# Patient Record
Sex: FEMALE | Race: WHITE | URBAN - METROPOLITAN AREA
[De-identification: names, ages, dates, MRNs, and addresses within clinical notes are randomized per-mention and may not be internally consistent; named-entity substitution may affect disease eponyms.]

---

## 2021-04-12 ENCOUNTER — HOSPITAL ENCOUNTER (EMERGENCY)
Age: 41
Discharge: LEFT BEFORE DISCHARGE | End: 2021-04-12

## 2021-04-12 VITALS — TEMPERATURE: 97.1 F

## 2021-04-12 NOTE — ED NOTES
Pt walked outside after getting registered with visitor. Triage nurse and LPN went outside to look for pt, unable to locate patient.       Wale Rehman LPN  [id number]

## 2023-02-27 ENCOUNTER — HOSPITAL ENCOUNTER (OUTPATIENT)
Dept: PSYCHIATRY | Age: 43
Setting detail: THERAPIES SERIES
Discharge: HOME | End: 2023-02-27
Payer: MEDICAID

## 2023-02-27 PROCEDURE — H0015 ALCOHOL AND/OR DRUG SERVICES: HCPCS

## 2023-02-27 NOTE — PLAN OF CARE
Client discussed in treatment team today. Present: Ashley Schultz , Deepika PAT, and Dr. Eli Sutherland M.D. Current Status: IOP     Treatment goals:    [x] Relapse prevention  [x] Increase sober support  [x] Increase attendance in sober support groups  [x] [place] effectively with cravings and urges  [x] Other: Relapse prevention     Recommendation: Client making good progress as evident by active participation in group, demonstrating insight , regular attendance at AA/ NA meetings. Reccommended to continue IOP to strengthen coping skills to manage cravings & urges.     Electronically signed by Lyla Luevano [place] 320 on 5/32/7445 at 2:43 PM

## 2023-02-27 NOTE — GROUP NOTE
Start Time: 900 AM  End Time: 10:15  Number of participants:8  Type of group: Psychotherapy  Mode of intervention: Education, Support, Socialization, Exploration, Clarifying, Problem-solving, Confrontation, Limit-setting, and Reality-testing  Topic: Motivation for Sobriety  Objective:  Explore and provide insight client to gain awareness on their reasoning for maintaining sobriety to aid recovery process. Note: Client actively participated in the group session. The session focused on assisting client with identifying reasons for sobriety and understanding reason's why past attempts at sobriety failed. Client reports motivation for quitting substance abuse is staying healthy and avoiding future consequences of addiction related to family issues and legal problems. Status after intervention:Improved  Participation level: Active Listener and Interactive  Participation Quality: Appropriate, Attentive, Sharing, and Supportive  Speech: normal  Thought Process/Content:Logical  Mood/Affect: brightens and congruent  Self report: None Reported  Response to learning: Able to verbalize current knowledge/experience, Able to verbalize/acknowledge new learning, Able to retain information, Capable of insight, Able to change behavior, and Progressing to goal  Discipline Responsible: /Counselor    [x] Check in completed and reviewed. Intervention required.  No    Electronically signed by Moise Yu on 9/46/1360 at 1:50 PM

## 2023-02-27 NOTE — GROUP NOTE
Start Time: 56 AM   End Time: 12:00 PM   Number of participants:7  Type of group: Psychoeducation  Mode of intervention: Education, Support, Socialization, Exploration, Clarifying, Problem-solving, Confrontation, Limit-setting, and Reality-testing  Topic: HIV/ AIDS/ STD education  Objective:  Explore the dangerous risks associated with substance use and aid client with gaining awareness to make improved decision making skills. Note: Client actively participated in the group session. Client was presented education and information from [facility] related to diease and health risk associated with substance use behaviors. Client was able to identify and verbalize and acknowledge 1-2 negative high risks associated with substance use behavior and disease risk. Status after intervention:Improved  Participation level: Active Listener and Interactive  Participation Quality: Appropriate, Attentive, Sharing, and Supportive  Speech: normal  Thought Process/Content:Logical  Mood/Affect: brightens and congruent  Self report: None Reported  Response to learning: Able to verbalize current knowledge/experience, Able to verbalize/acknowledge new learning, Able to retain information, Capable of insight, Able to change behavior, and Progressing to goal  Discipline Responsible: /Counselor    [x] Check in completed and reviewed. Intervention required.  No    Electronically signed by Edu Khan on 2/08/8703 at 1:59 PM

## 2023-02-28 ENCOUNTER — HOSPITAL ENCOUNTER (OUTPATIENT)
Dept: PSYCHIATRY | Age: 43
Setting detail: THERAPIES SERIES
Discharge: HOME | End: 2023-02-28
Payer: MEDICAID

## 2023-02-28 PROCEDURE — 80305 DRUG TEST PRSMV DIR OPT OBS: CPT

## 2023-02-28 NOTE — GROUP NOTE
( NON- BILLABLE )    Start Time: 900 AM   End Time: 10:00 AM   Number of participants:10  Type of group: Psychotherapy  Mode of intervention: Education, Support, Socialization, Exploration, Clarifying, Problem-solving, Confrontation, Limit-setting, and Reality-testing  Topic: Coping   Objective:  Explore techniques and coping skills to assist client with gaining awareness and recovery tools to aid continued sobriety. Note: Client minimally participated in the group session due to difficulty staying awake and falling asleep in group. Client reports she was unable to sleep yesterday and counselor allowed client to go home because she was not able to focus and her falling asleep was triggering to other clients. Status after intervention:Unchanged  Participation level: Minimal  Participation Quality: Inappropriate and Lethargic  Speech: normal  Thought Process/Content:Logical  Mood/Affect: calm  Self report: None Reported  Response to learning: Resistant  Discipline Responsible: /Counselor    [x] Check in completed and reviewed. Intervention required.  No    Electronically signed by Lyla Khan [place] 320 on 5/5/2233 at 9:04 AM

## 2023-03-02 ENCOUNTER — HOSPITAL ENCOUNTER (OUTPATIENT)
Dept: PSYCHIATRY | Age: 43
Setting detail: THERAPIES SERIES
Discharge: HOME | End: 2023-03-02
Payer: MEDICAID

## 2023-03-02 PROCEDURE — H0015 ALCOHOL AND/OR DRUG SERVICES: HCPCS

## 2023-03-03 NOTE — BH NOTE
Group Therapy Note    Start Time: 0900  End Time:   0945  Number of participants: 8  Type of group: Exercise for mental health    Renay Valenzuela JUNE-CNP     Mode of intervention: Education, Support, Socialization, Exploration, Clarifying, Problem-solving. Topic: Anxiety, depression, substance dependence   Objective: To help develop awareness and to explore physical exercise that can help with anxiety, depression, substance dependence to improve mood. Note: Client actively participated in the group session. Exercise was presented and information given related to anxiety, depression and substance dependence. Client thoughts and feelings toward exercise were explored. Client was receptive to information presented. Status after intervention:  Improved    Participation level; Active listener and interactive    Participation Quality:  Appropriate, Attentive and Sharing    Speech:  Normal  Thoughts Process/Content:  Logical  Mood/Affect:  Euthymic/ Bright  Self-Report: None Reported  Response to learning:  Able to verbalize current knowledge/experience. Able to verbalize new learning. Was able to retain information and capable of insight. Discipline Responsible:  PMHNP    Patients were told only do the exercise that they could do. They were not forced to exercise. They were given the option to watch and not participate.      Diagnosis F19.20

## 2023-03-06 ENCOUNTER — HOSPITAL ENCOUNTER (EMERGENCY)
Age: 43
Discharge: LEFT BEFORE DISCHARGE | End: 2023-03-06

## 2023-03-06 ENCOUNTER — HOSPITAL ENCOUNTER (OUTPATIENT)
Dept: PSYCHIATRY | Age: 43
Setting detail: THERAPIES SERIES
Discharge: HOME | End: 2023-03-06
Payer: MEDICAID

## 2023-03-06 PROCEDURE — 80305 DRUG TEST PRSMV DIR OPT OBS: CPT

## 2023-03-06 PROCEDURE — H0015 ALCOHOL AND/OR DRUG SERVICES: HCPCS

## 2023-03-06 NOTE — PROGRESS NOTES
Start Time: 900 AM   End Time : 10:30 AM  Number of participants:9  Type of group: Psychotherapy  Mode of intervention: Education, Support, Socialization, Exploration, Clarifying, Problem-solving, Confrontation, Limit-setting, and Reality-testing  Topic: Isolation  Objective: Explore relapse risks associated with isolation and develop skills to increase sober support to aid continued sobriety. Note: Client actively participated in the group session. Client reported no isolation  and she shared with the group 2-4 behavioral changes she has made that consist of going to meetings, attending [employer] , and being with family as ways to increase sober support to reduce isolation and boredom. Status after intervention:Improved  Participation level: Active Listener and Interactive  Participation Quality: Appropriate, Attentive, Sharing, and Supportive  Speech: normal  Thought Process/Content:Logical  Mood/Affect: brightens and congruent  Self report: None Reported  Response to learning: Able to verbalize current knowledge/experience, Able to verbalize/acknowledge new learning, Able to retain information, Capable of insight, Able to change behavior, and Progressing to goal  Discipline Responsible: /Counselor    [x] Check in completed and reviewed. Intervention required.  No    Electronically signed by Lyla Sena [place] 320 on 6/3/7733 at 3:52 PM

## 2023-03-06 NOTE — PLAN OF CARE
[facility]- Level of Care Placement      []Admissions  [x]Continued Stay [x]Complication in [place] - relapse 2/23/23 - Amphetamines Date:3/6/2023          Level of Care Level 1      Outpatient Services Level 2.1   Intensive Outpatient Services(IOP) Level 2.5   Partial Hospitalization Services Level 3.1 CLINICALLY Managed Low-Intensity Residential Services Level 3.3  CLINICALLY Managed Population- Specific High- Intensity Residential Services Level 3.5  CLINICALLY Managed High Intensive Residential Services Level 3.7  MEDICALLY Monitored Intensive Inpatient Services Level 4  MEDICALLY Managed Intensive Inpatient Services   Dimension 1  Acute Intoxication and/or Withdrawal Potential [x] Not experiencing significant withdrawal    [] Minimal  risk of severe  withdrawal [] Minimal  risk of severe  withdrawal    [] Manageable  at Level 2-WM [] Moderate  risk of severe withdrawal    [] Manageable at Level 2-WM [] No withdrawal risk or minimal or stable withdrawal     [] Concurrently receiving Level -WM or Level 2-WM services [] Minimal risk of severe withdrawal    [] If withdrawal is present, manageable at Level 3. 2-WM  [] Minimal risk of severe withdrawal    [] If withdrawal is present manageable at Level 3. 2-WM [] High risk of withdrawal, but manageable at Level  3.7-WM and does not require  full resources of a licensed hospital [] At high risk of withdrawal and requires Level 4- and full resources of licensed hospital    COMMENTS:           Dimension 2   Biomedical Conditions and Complications  (BMC/C) [x] None or very stable    [] Receiving concurrent medical monitoring  [] None or not a distraction from treatment    [] Problems are manageable at Level 2.1 [] None or not sufficient to distract from treatment    [] Problems are manageable at  Level 2.5 [] None or stable    [] Receiving concurrent medical monitoring  [] None or stable    [] Receiving concurrent medical monitoring  [] None or stable    [] Receiving concurrent medical monitoring [] Requires 24-hour medical monitoring  but not intensive treatment [] Requires 24-hour medical & nursing care and the full  resources of a licensed hospital   COMMENTS:           Dimension 3  Emotional,  Behavioral,  or Cognitive Conditions and Complications   (EBC/C) [x] None or very stable    [] Receiving concurrent mental health monitoring [] Mild severity  with potential to distract from recovery; needs monitoring [] Mild to moderate severity, with potential to distract from recovery, needs stabilization [] None or minimal; not distracting to recovery    [] If  stable, a    co-occurring capable program is appropriate    [] If not stable, a co-occurring enhanced program is required [] Mild to moderate severity; needs structure to focus on recovery. Treatment should be designed to address significant cognitive deficits     [] If  stable, a  co-occurring capable program is appropriate    [] If not stable, a co-occurring enhanced program is required [] Demonstrates repeated inability to control impulses or unstable & dangerous signs/ symptoms require stabilization. Other functional deficits require stabilization and 24-hr.  setting to prepare for community integration  & continuing care    [] Co-occurring enhanced setting is required for those with severe & chronic mental illness [] Moderate severity;  needs 24-hour   structured setting    [] If client has co-occurring mental disorder, requires concurrent mental health services in a medically monitored setting  [] Severe and unstable problems;  requires 24-hour psychiatric care  with concomitant addiction treatment   COMMENTS:             Electronically signed by Lyla Leija [place] 320 on 6/1/1956 at 5:36 PM                  [address] Placement      []Admissions  [x]Continued Stay [x]Complication in [place]- Relapse to Amphetamines Date:3/6/2023          Level of Care Level 1  Outpatient   Services Level 2.1  Intensive  Outpatient  Services Level 2.5  Partial Hospitalization Services Level 3.1  CLINICALLY Managed Low-intensity Residential Services Level 3.3  CLINICALLY Managed Population- Specific High- Intensity Residential Services Level 3.5  CLINICALLY Managed High-Intensive Residential Services Level 3.7  MEDICALLY Monitored Intensive Inpatient Services Level 4  MEDICALLY Managed Intensive Inpatient Services   Dimension 4  Readiness  To  Change [] Ready for recovery but needs motivating and monitoring strategies to strengthen readiness    []Needs ongoing monitoring and disease management    [] High severity in this dimension but not in other dimensions. Needs Level 1 motivational enhancement strategies   [x] Has variable engagement in treatment, ambivalence, or lack of awareness of substance use or mental health problems and requires structured program several times/wk. to promote progress through stages of change [] Has poor engagement in treatment, significant ambivalence, or lack of awareness of substance use or mental health problems, requires near daily structured program or intensive engagement to promote progress through stages of change [] Open to recovery, but needs structured environment to maintain therapeutic gains [] Has little awareness & needs interventions at Level 3.3 to engage & stay in treatment.     [] If there is high severity in this dimension, but not in any other dimension, motivational enhancement strategies should be provided in Level 1 [] Has marked difficulty with, or opposition to treatment with dangerous consequences    [] If there is high severity in this dimension, but not in any other dimension, motivational enhancement strategies should be provided in Level 1 [] Low interest in treatment and impulse control is poor despite negative consequences;  needs motivating strategies only safely available in 24-hour structured setting    [] If there is high severity in this dimension, but not in any other dimension, motivational enhancement strategies should be provided in Level 1 [] Problems in this dimension do not qualify the client for Level 4 services    [] If the client's only severity is in Dimension 4,5, and/or 6 without high severity in Dimensions 1,2, and/or 3, then client is not qualified for Level 4   COMMENTS:           Dimension 5  Relapse, Continued Use or Continued Problem Potential  [] Able to maintain abstinence or control use and/or addictive behaviors  and pursue recovery or motivational goals with minimal support [x] Intensification of addiction or mental health symptoms indicate high likelihood of relapse risk or continued use or continued problems without  close monitoring and support several times/wk.  [] Intensification of addiction or mental health symptoms, despite active participation in a Level 1 or 2.1 program, indicates high likelihood of relapse or continued use or continued problems without near-daily monitoring [] Understands relapse but needs structure to maintain therapeutic gains  [] Has little awareness and needs interventions available only at Level 3.3 to prevent continued use, with imminent dangerous consequences, because of cognitive deficits or  comparable dysfunction  [] Has no recognition  of the skills needed to prevent continued use,  with imminently dangerous  consequences [] Unable to control use, with imminently dangerous consequences,  despite active participation at less intensive levels of care [] Problems in this dimension do not qualify the client for Level 4 services    [] If the client's only severity is in Dimension 4,5, and/or 6 without high severity in Dimensions 1,2, and/or 3, then client is not qualified for Level 4   COMMENTS:           Dimension 6  Recovery/Living Environment [x]  Recovery environment is supportive and/or the client has skills to cope [] Recovery environment is not supportive  but with structure and support, the client can cope [] Recovery environment is not supportive, but with structure and support and relief from the home environment, client can cope [] Environment    is dangerous, but recovery is achievable if Level 3.1 24-hour structure is available  [] Environment is dangerous and  client needs 24-hour structure to learn to cope [] Environment is dangerous, and the client lacks skills to cope outside of a  highly structured 24-hour setting   [] Environment is dangerous, and the client lacks skills to cope outside of a  highly structured 24-hour setting [] Problems in this dimension do not qualify the client for Level 4 services    [] If the client's only severity is in Dimension 4,5, and/or 6 without high severity in Dimensions 1,2, and/or 3, then client is not qualified for Level 4   COMMENTS:             Electronically signed by Lyla Montague [place] 320 on 6/8/3216 at 5:37 PM

## 2023-03-06 NOTE — CARE COORDINATION
Counselor reviewed with client positive drug screen result for Amphetamines on 2/23/2023. Client initially was not honest about her usage , however she admitted to licking a bag that had Methamphetamines because she found it in her house. Counselor processed with client the dangerous impact of her relapse and processed with client her lack of insight and poor judgement. In addition, her  was called and made aware of clients relapse and client was told that her treatment will be extended to strengthen her recovery process.    Electronically signed by FRANCISCO Gleason on 3/6/2023 at 5:34 PM

## 2023-03-06 NOTE — FLOWSHEET NOTE
Client left the group and went to her doctor's office from 11:00 AM to 12:00 PM due to reporting feeling that her throat was closing and she was excused.     Electronically signed by Lyla [place] 320 on 9/5/7457 at 5:31 PM

## 2023-03-07 ENCOUNTER — HOSPITAL ENCOUNTER (OUTPATIENT)
Dept: PSYCHIATRY | Age: 43
Setting detail: THERAPIES SERIES
Discharge: HOME | End: 2023-03-07
Payer: MEDICAID

## 2023-03-13 ENCOUNTER — HOSPITAL ENCOUNTER (OUTPATIENT)
Dept: PSYCHIATRY | Age: 43
Setting detail: THERAPIES SERIES
Discharge: HOME | End: 2023-03-13
Payer: COMMERCIAL

## 2023-03-13 PROCEDURE — 80305 DRUG TEST PRSMV DIR OPT OBS: CPT

## 2023-03-13 PROCEDURE — H0015 ALCOHOL AND/OR DRUG SERVICES: HCPCS

## 2023-03-13 NOTE — PROGRESS NOTES
Start Time: 900 AM   End Time: 56 AM  Number of participants:10  Type of group: Psychotherapy  Mode of intervention: Education, Support, Socialization, Exploration, Clarifying, Activity, Confrontation, Limit-setting, and Reality-testing  Topic: Triggers & Relapse Prevention  Objective:  Explore psychological set-up to assist client with gaining awareness  to prevent relapse and increasing recovery tools. Note: Client actively participated in the group session and admitted to the group that she relapsed (2) weeks ago by licking a bag with Methamphetamine resin. Client verbalized poor time management , and lack of self care as triggers for relapse, however client seems unmotivated to focus on herself , and her recovery. She verbalizes no active attendance at AA/ NA support group meetings. Counselor processed with client behavorial changes she needs to make by putting her recovery first and getting active with her sponsor. Status after intervention:Unchanged  Participation level: Active Listener and Interactive  Participation Quality: Appropriate, Attentive, and Sharing  Speech: normal  Thought Process/Content:Logical  Mood/Affect: anxious  Self report: None Reported  Response to learning: Able to verbalize current knowledge/experience and Resistant  Discipline Responsible: /Counselor    [x] Check in completed and reviewed. Intervention required.  No    Electronically signed by Charmayne Handy, [address] on 6/75/2086 at 2:46 PM

## 2023-03-13 NOTE — PROGRESS NOTES
Start Time: [id number] AM   End Time: 12:00 PM  Number of participants:9  Type of group: Psychoeducation  Mode of intervention: Education, Support, Socialization, Exploration, Clarifying, Problem-solving, Confrontation, Limit-setting, and Reality-testing  Topic: Disease Model of Addiction  Objective:  Explore and process how substance abuse is a disease and impacts on the brain. Note: Client actively participated in the group session. Client was presented education on the diease model of addiction, such as primary, chronic , progressive , genetic, and fatal . Client was able to acknowledge aspects of the disease model and verbalize 1-2 insights from the education. Status after intervention:Unchanged  Participation level: Active Listener, Interactive, and Minimal  Participation Quality: Appropriate and Attentive  Speech: normal  Thought Process/Content:Logical  Mood/Affect: anxious  Self report: None Reported  Response to learning: Able to verbalize current knowledge/experience, Able to verbalize/acknowledge new learning, Able to retain information, and Capable of insight  Discipline Responsible: /Counselor    [x] Check in completed and reviewed. Intervention required.  No    Electronically signed by Lyla [place] 320 on 3/51/8086 at 5:21 PM

## 2023-03-14 ENCOUNTER — HOSPITAL ENCOUNTER (OUTPATIENT)
Dept: PSYCHIATRY | Age: 43
Setting detail: THERAPIES SERIES
Discharge: HOME | End: 2023-03-14
Payer: COMMERCIAL

## 2023-03-14 NOTE — FLOWSHEET NOTE
Client reported off for 3/9/2023 stating she was being transported to [facility] in [place] and would not be in group.

## 2023-03-14 NOTE — FLOWSHEET NOTE
Luis Shook called off group again today stating she did not get any sleep last night . Client was told her absence will not be excused but she can take the day off. Client was told she needs to prioritize her time better and she was suggested to get a mental health assessment due to reporting feeling \"anxious\" and \"manic\". Counselor provided client of local mental health agencies she can call to set-up for assessment.     Electronically signed by Lyla Duncan [place] 320 on 3/11/3065 at 8:49 AM

## 2023-03-15 NOTE — CARE COORDINATION
3/15/2023: Client called in for random drug screen at 10:57 AM. Client was told she has until 4:00 PM to submit random drug screen and also informed if she does not report for screening it is a positive result  Electronically signed by Edu Sanchez [place] on 8/88/7181 at 10:59 AM

## 2023-03-15 NOTE — PLAN OF CARE
Client discussed in treatment team today. Present: Cathleen Leonard , Deepika PAT, and Dr. Ashley Brambila M.D..     Current Status: IOP     Treatment goals:    [x] Relapse prevention  [x] Increase sober support  [x] Increase attendance in sober support groups  [x] Alligator effectively with cravings and urges  [] Other:      Recommendation: Client relapsed and used Amphetamines, lack of insight and commitment to recovery. Client treatment is extended and her meetings will increase to 3 a week.     Electronically signed by Edu Elias on 5/39/5127 at 11:23 AM

## 2023-03-16 ENCOUNTER — HOSPITAL ENCOUNTER (OUTPATIENT)
Dept: PSYCHIATRY | Age: 43
Setting detail: THERAPIES SERIES
Discharge: HOME | End: 2023-03-16
Payer: COMMERCIAL

## 2023-03-16 PROCEDURE — 80305 DRUG TEST PRSMV DIR OPT OBS: CPT

## 2023-03-16 PROCEDURE — H0015 ALCOHOL AND/OR DRUG SERVICES: HCPCS

## 2023-03-16 NOTE — PROGRESS NOTES
Start Time: 65 AM  End Time: 12:00 PM  Number of participants:8  Type of group: Relapse Prevention  Mode of intervention: Education, Support, Socialization, Exploration, Clarifying, Problem-solving, Confrontation, Limit-setting, and Reality-testing  Topic: Consequences of addiction behavior  Objective:  Recognize your own behaviors and thoughts that cause on-going consequences of addiction . Note: Client actively participated in the group session. Client was assigned to identify and explore 1-2 consequences that have resulted from substance use. Client identified and explored legal issues as a consequences and she identified staying active with sober support as a  coping skill to prevent re-occurring consequences. Client completed the IOP program on this date. Status after intervention:Improved  Participation level: Active Listener and Interactive  Participation Quality: Appropriate, Attentive, and Sharing  Speech: normal  Thought Process/Content:Logical  Mood/Affect: calm and congruent  Self report: None Reported  Response to learning: Able to verbalize current knowledge/experience, Able to verbalize/acknowledge new learning, Able to retain information, and Capable of insight  Discipline Responsible: /Counselor    [x] Check in completed and reviewed. Intervention required.  No    Electronically signed by Lyla [place] 320 on 4/62/6240 at 3:55 PM

## 2023-03-16 NOTE — PROGRESS NOTES
Start Time : 80 AM  End Time: 10:30 AM  Number of participants:8  Type of group: Psychotherapy  Mode of intervention: Education, Support, Socialization, Exploration, Clarifying, Problem-solving, Confrontation, Limit-setting, and Reality-testing  Topic: Self-Care  Objective:  Develop a better understanding of ones own needs and ways to meet them without substance use. Note: Client active in group session. She was assigned to identify and explore 1-2 self-care needs. Client tearfully identified and verbalized time management as need in her recovery and she was able to share how she has lost herself in rescuing other related to her children and not making her sobriety a priorit      Status after intervention:Unchanged  Participation level: Active Listener and Interactive  Participation Quality: Appropriate, Attentive, and Sharing  Speech: normal  Thought Process/Content:Logical  Mood/Affect: anxious and tearful  Self report: None Reported  Response to learning: Able to verbalize current knowledge/experience, Able to verbalize/acknowledge new learning, Able to retain information, and Capable of insight  Discipline Responsible: /Counselor    [x] Check in completed and reviewed. Intervention required.  No    Electronically signed by Lyla Finnegan 320 on 0/20/0213 at 3:11 PM

## 2023-03-20 ENCOUNTER — HOSPITAL ENCOUNTER (OUTPATIENT)
Dept: PSYCHIATRY | Age: 43
Setting detail: THERAPIES SERIES
Discharge: HOME | End: 2023-03-20
Payer: COMMERCIAL

## 2023-03-20 PROCEDURE — 99214 OFFICE O/P EST MOD 30 MIN: CPT

## 2023-03-20 PROCEDURE — 80305 DRUG TEST PRSMV DIR OPT OBS: CPT

## 2023-03-20 PROCEDURE — H0015 ALCOHOL AND/OR DRUG SERVICES: HCPCS

## 2023-03-20 RX ORDER — BUSPIRONE HYDROCHLORIDE 15 MG/1
15 TABLET ORAL 2 TIMES DAILY
Status: DISCONTINUED | OUTPATIENT
Start: 2023-03-20 | End: 2023-03-20

## 2023-03-20 RX ORDER — BUSPIRONE HYDROCHLORIDE 15 MG/1
15 TABLET ORAL 2 TIMES DAILY
Qty: 60 TABLET | Refills: 0 | Status: SHIPPED | OUTPATIENT
Start: 2023-03-20 | End: 2023-04-19

## 2023-03-20 NOTE — PROGRESS NOTES
Start Time 56 AM  End Time:12:00 PM  Number of participants:4  Type of group: Psychoeducation  Mode of intervention: Education, Support, Socialization, Exploration, Clarifying, Problem-solving, Media, Confrontation, Limit-setting, and Reality-testing  Topic: Disease Concept of Addiction  Objective:  Provide client with awareness on stages of addiction to aid improved decision making and awareness related to substance use disorder. Note: Client actively participated in the group session. She was assigned to view education \" The Disease of Addiction Symptoms & Phases\" By Mely Farias. Client was able to acknowledge that substance use is a disease and related to 2-3 dangerous symptoms and phases of the substance use disorder. Status after intervention:Improved  Participation level: Active Listener and Interactive  Participation Quality: Appropriate, Attentive, Sharing, and Supportive  Speech: normal  Thought Process/Content:Logical  Mood/Affect: congruent  Self report: None Reported  Response to learning: Able to verbalize current knowledge/experience, Able to verbalize/acknowledge new learning, Able to retain information, Capable of insight, and Able to change behavior  Discipline Responsible: /Counselor    [x] Check in completed and reviewed. Intervention required.  No  Electronically signed by Lyla Gonzalez 320 on 0/62/4992 at 5:36 PM

## 2023-03-20 NOTE — PROGRESS NOTES
PSYCHIATRY ATTENDING NOTE:    S: Patient being seen at [facility] in follow-up for polysubstance use. She has a history of heroin use for two years when she was 24years old but has been using methamphetamine for three years now. She has been taking Wellbutrin  mg every morning for smoking cessation from her primary. Met with patient to discuss progress with treatment. Also discussed patient with treatment team. On assessment patient is alert and orient, pleasant and cooperative. She is talkative and appears anxious. Patient reports that she has been having anxiety for \"a while now. \" She rates her anxiety as a 4/10 currently and voiced stress, anxiety and decreased sleep. Her last use of methamphetamine was February 25th. She states she has been approved for a house to buy in [facility] and this is causing a great deal of stress. Appetite seems to be stable. She denies depression. Patient is not suicidal, homicidal, manic or psychotic. No voiced delusions or hallucinations. MSE: Bertha Meyers female appears age. Pleasant, cooperative, forthcoming. Normal psychomotor activity, strength, tone, eye contact. Gait not assessed. Mood anxious. Affect flexible. Speech clear. Thoughts organized. Content future oriented. No suicidal or homicidal ideations. No paranoia, delusions or hallucinations. Orientation, concentration, recent and remote memory are grossly intact. Fund of knowledge fair. Language use fair. Insight and judgment fair. MEDICATIONS:  Wellbutrin  mg daily (Cont.)     Buspar 15 mg bid         ASSESSMENT:  Stimulant Use Disorder    PLAN: Will start Buspar for anxiety and patient is in agreement with this. Continue IOP and current medication. Continue to monitor symptoms, side effects and response to medications. See back in one week.  Discuss with treatment team.                          Electronically signed by JUNE Keene CNP on 3/20/2023 at 10:06 AM

## 2023-03-20 NOTE — PROGRESS NOTES
Start Time: 900 AM  End Time: 10:15 AM  Number of participants:4  Type of group: Psychotherapy  Mode of intervention: Education, Support, Socialization, Exploration, Clarifying, Problem-solving, Confrontation, Limit-setting, and Reality-testing  Topic: Stress   Objective: Explore stressors in recovery that triggers substance use and explore alternative ways of coping with stress. Note: Client participated in the group session. She reported not having enough time for herself as a trigger and shared how it has led to anxiety and relapse. Client verbalized how over the past weekend she took time for herself and went to 4 meetings and set boundaries with her children as recovery coping skills to aid making herself and her sobriety a priority to manage stress. Status after intervention:Improved  Participation level: Active Listener and Interactive  Participation Quality: Appropriate, Attentive, and Sharing  Speech: normal  Thought Process/Content:Logical  Mood/Affect: congruent  Self report: None Reported  Response to learning: Able to verbalize current knowledge/experience, Able to verbalize/acknowledge new learning, Able to retain information, Capable of insight, Able to change behavior, and Progressing to goal  Discipline Responsible: /Counselor    [x] Check in completed and reviewed. Intervention required.  No  Electronically signed by Lyla Adams Dukes 320 on 2/97/6734 at 4:36 PM

## 2023-03-21 ENCOUNTER — HOSPITAL ENCOUNTER (OUTPATIENT)
Dept: PSYCHIATRY | Age: 43
Setting detail: THERAPIES SERIES
Discharge: HOME | End: 2023-03-21
Payer: COMMERCIAL

## 2023-03-21 PROCEDURE — H0015 ALCOHOL AND/OR DRUG SERVICES: HCPCS

## 2023-03-21 NOTE — PROGRESS NOTES
Start Time: 80 AM   End  Time: 56 AM   Number of participants:7  Type of group: Psychotherapy  Mode of intervention: Education, Support, Socialization, Exploration, Clarifying, Problem-solving, Confrontation, Limit-setting, and Reality-testing  Topic: Self-care   Objective: To help client's understand the significance of self-care to recovery efforts. Note: Client actively participated in the session. Client was assigned to identify how practicing proper self-care can assist in recovery efforts. Client verbalized how improving her self care will aid improved self esteem can prevent negative consequences of drug use behaviors. Status after intervention:Improved  Participation level: Active Listener and Interactive  Participation Quality: Appropriate, Attentive, Sharing, and Supportive  Speech: normal  Thought Process/Content:Logical  Mood/Affect: brightens  Self report: None Reported  Response to learning: Able to verbalize current knowledge/experience, Able to verbalize/acknowledge new learning, Able to retain information, Capable of insight, Able to change behavior, and Progressing to goal  Discipline Responsible: /Counselor    [x] Check in completed and reviewed. Intervention required.  No    Electronically signed by Alvira Landau on 9/99/5811 at 1:46 PM

## 2023-03-21 NOTE — BH NOTE
GROUP NOTE FOR EXERCISE GROUP  Start Time: 0900  End Time:   0945  Number of participants: 6  Type of group: Nutrition for mental health    Kitty MCGRAW     Mode of intervention: Education, Support, Socialization, Exploration, Clarifying, Problem-solving. Topic: Anxiety, depression, substance dependence nutritional group  Objective: To help develop awareness and to explore food to eat that can help with anxiety, depression, substance dependence to help with mental health. Note: Client actively participated in the group session. Food was presented and information given related to anxiety, depression and substance dependence. Client thoughts and feelings toward nutrition were explored. Client was receptive to information presented. Status after intervention:  Improved    Participation level; Active listener and interactive    Participation Quality:  Appropriate, Attentive and Sharing    Speech:  Normal  Thoughts Process/Content:  Logical  Mood/Affect:  Euthymic/ Bright  Self-Report: None Reported  Response to learning:  Able to verbalize current knowledge/experience. Able to verbalize new learning. Was able to retain information and capable of insight.     Discipline Responsible:  PMHNP    Diagnosis F19.20

## 2023-03-21 NOTE — PROGRESS NOTES
Start Time: 10:45 AM   End Time: 12:00 PM   Number of participants:6  Type of group: Recovery  Mode of intervention: Education, Support, Socialization, Exploration, Clarifying, Problem-solving, Activity, Media, Limit-setting, and Reality-testing  Topic: Recovery Process to Staying Sober  Objective:  Gain insight , strength,and hope from lived experience from peer recovery supporter. Note: Client actively participated in the group session. Client related to the lived experiences and was able to share 1-2 examples of personal feedback , and verbalized 1-2 positive impacts gained from the peer supporters Flaca Warren and Dorene Gamez. Status after intervention:Improved  Participation level: Active Listener and Interactive  Participation Quality: Appropriate, Attentive, and Sharing  Speech: normal  Thought Process/Content:Logical  Mood/Affect: brightens and congruent  Self report: None Reported  Response to learning: Able to verbalize current knowledge/experience, Able to verbalize/acknowledge new learning, Able to retain information, Capable of insight, Able to change behavior, and Progressing to goal  Discipline Responsible: /Counselor    [x] Check in completed and reviewed. Intervention required.  No    Electronically signed by Lyla Tony Amador 320 on 8/98/2999 at 2:07 PM

## 2023-03-23 ENCOUNTER — HOSPITAL ENCOUNTER (OUTPATIENT)
Dept: PSYCHIATRY | Age: 43
Setting detail: THERAPIES SERIES
Discharge: HOME | End: 2023-03-23
Payer: COMMERCIAL

## 2023-03-24 NOTE — FLOWSHEET NOTE
Client excused due to reporting having to attend court for probation.   Electronically signed by Lyla Reyes 320 on 4/21/6584 at 8:26 AM

## 2023-03-27 ENCOUNTER — HOSPITAL ENCOUNTER (OUTPATIENT)
Dept: PSYCHIATRY | Age: 43
Setting detail: THERAPIES SERIES
Discharge: HOME | End: 2023-03-27
Payer: COMMERCIAL

## 2023-03-27 NOTE — PLAN OF CARE
Client discussed in treatment team today. Present: Viridiana Leonard , Deepika PAT, and Dr. Ricci Yarbrough M.D..     Current Status: IOP     Treatment goals:    [x] Relapse prevention  [x] Increase sober support  [x] Increase attendance in sober support groups  [x] [place] effectively with cravings and urges  [] Other:      Recommendation: Client improving her commitment to her recovery by attending 4 meetings and getting back active with her sponsor and will be ready to graduate the IOP program.In addition, she is seeing NO for medical management related to her anxiety issues.      Electronically signed by Edu Taylor on 2/35/1511 at 2:07 PM

## 2023-03-27 NOTE — FLOWSHEET NOTE
Client did not attend group on this day due to having to attend court for probation.   Electronically signed by Lyla Solorzano [place] 320 on 8/66/9983 at 1:49 PM

## 2023-03-28 ENCOUNTER — HOSPITAL ENCOUNTER (OUTPATIENT)
Dept: PSYCHIATRY | Age: 43
Setting detail: THERAPIES SERIES
Discharge: HOME | End: 2023-03-28
Payer: COMMERCIAL

## 2023-03-28 PROCEDURE — H0015 ALCOHOL AND/OR DRUG SERVICES: HCPCS

## 2023-03-28 NOTE — PROGRESS NOTES
Start Time: 80 AM   End  Time: 56 AM   Number of participants:6  Type of group: Psychotherapy  Mode of intervention: Education, Support, Socialization, Exploration, Clarifying, Problem-solving, Confrontation, Limit-setting, and Reality-testing  Topic: Self-care   Objective: To help client's understand the significance of self-care to recovery efforts. Note: Client actively participated in the session and she did not show verification of attending any support group meetings. Client was assigned to identify an area she needs to focus on to improve self-care , and assist in recovery efforts. Client verbalized improving her sleep and she discussed 2-3 ways she began to address her sleep issues and identified behavior changes that will support getting more sleep , however she reports that she relapsed and used a caffeine pill to stay up and she reported she doesn't know why she did it but she needed to stay up and she did not get any sleep yesterday. Counselor processed relapse behaviors and told client her IOP graduation will be placed on hold until counselor speaks with Dr. Edwin Cruz. Status after intervention:Decompensated  Participation level: Active Listener and Interactive  Participation Quality: Appropriate, Attentive, Sharing, and Resistant  Speech: normal  Thought Process/Content:Logical  Mood/Affect: anxious  Self report: None Reported  Response to learning: Resistant  Discipline Responsible: /Counselor    [x] Check in completed and reviewed. Intervention required.  no   Electronically signed by Asim Alarcon on 7/99/0295 at 1:10 PM
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
## 2023-03-28 NOTE — BH NOTE
GROUP NOTE FOR EXERCISE GROUP  Start Time: 0900  End Time:   0945  Number of participants: 5  Type of group: Nutrition for mental health    Richard Cotto JUNE-CNP     Mode of intervention: Education, Support, Socialization, Exploration, Clarifying, Problem-solving. Topic: Anxiety, depression, substance dependence nutritional group  Objective: To help develop awareness and to explore food to eat that can help with anxiety, depression, substance dependence to help with mental health. Note: Client actively participated in the group session. Food was presented and information given related to anxiety, depression and substance dependence. Client thoughts and feelings toward nutrition were explored. Client was receptive to information presented. Status after intervention:  Improved    Participation level; Active listener and interactive    Participation Quality:  Appropriate, Attentive and Sharing    Speech:  Normal  Thoughts Process/Content:  Logical  Mood/Affect:  Euthymic/ Bright  Self-Report: None Reported  Response to learning:  Able to verbalize current knowledge/experience. Able to verbalize new learning. Was able to retain information and capable of insight.     Discipline Responsible:  PMHNP    Diagnosis F19.20

## 2023-03-28 NOTE — PROGRESS NOTES
Start Time: 65 AM   End Time: 12:00 PM  Number of participants:6  Type of group: Relapse Prevention  Mode of intervention: Education, Support, Socialization, Exploration, Clarifying, Problem-solving, Media, Confrontation, Limit-setting, and Reality-testing  Topic: Refusal Skills   Objective:  Provide client insight, awareness to practicing coping strategies to avoid relapse. Note: Client actively participated in the group session. He was assigned to view education by [employer] \" Refusal Skills\". Client related to the education and identified 2-4 coping skills that can be used to prevent relapse behaviors. Status after intervention:Improved  Participation level: Active Listener and Interactive  Participation Quality: Appropriate, Attentive, and Sharing  Speech: normal  Thought Process/Content:Logical  Mood/Affect: brightens  Self report: None Reported  Response to learning: Able to verbalize current knowledge/experience, Able to verbalize/acknowledge new learning, Able to retain information, and Capable of insight  Discipline Responsible: /Counselor    [x] Check in completed and reviewed. Intervention required.  No    Electronically signed by Lyla Avila Anderson 320 on 2/03/0474 at 3:06 PM

## 2023-03-30 ENCOUNTER — HOSPITAL ENCOUNTER (OUTPATIENT)
Dept: PSYCHIATRY | Age: 43
Setting detail: THERAPIES SERIES
Discharge: HOME | End: 2023-03-30
Payer: COMMERCIAL

## 2023-03-30 PROCEDURE — 80305 DRUG TEST PRSMV DIR OPT OBS: CPT

## 2023-03-30 PROCEDURE — H0015 ALCOHOL AND/OR DRUG SERVICES: HCPCS

## 2023-03-30 NOTE — PROGRESS NOTES
Start Time: 900 AM   End Time: 56 AM   Number of participants:5  Type of group: Psychotherapy  Mode of intervention: Education, Support, Socialization, Exploration, Clarifying, Problem-solving, Confrontation, Limit-setting, and Reality-testing  Topic: Triggers/ Craving   Objective:  Explore coping skills and behavior patters related to managing cravings & urges to aid continued sobriety. Note: Client actively participated in the group session. Client's patterns and behaviors were probed to identify coping skills that she uses to manage cravings & urges as she reported struggling at times  with cravings & urges when feelings, stressed & overwhelmed. Counselor explored and processed with client not using suggested tools to manage ,or eliminate c raving and client agreed she is not using tolls and admits she is impulsive and does not think through her thoughts as errors in her recovery process. Status after intervention:Unchanged  Participation level: Active Listener and Interactive  Participation Quality: Appropriate, Attentive, and Sharing  Speech: normal  Thought Process/Content:Logical  Mood/Affect: congruent  Self report: None Reported  Response to learning: Able to verbalize current knowledge/experience and Resistant  Discipline Responsible: /Counselor    [x] Check in completed and reviewed. Intervention required.  No  Electronically signed by Lyla Johnson [place] 320 on 1/93/8485 at 1:33 PM

## 2023-03-30 NOTE — PROGRESS NOTES
Start Time: 1045 AM   End Time: 12:00 PM  Number of participants:5  Type of group: Psychoeducation  Mode of intervention: Education, Support, Socialization, Exploration, Clarifying, Problem-solving, Media, Confrontation, Limit-setting, and Reality-testing  Topic: Craving & Relapse   Objective:  Explore and gain awareness related to coping skills and strategies used to eliminate , or manage craving to avoid relapse. Note: Client actively participated in the group session. He was assigned to view education \" Craving & Relapse\" by Jill Mcgraw. Client related to the education and was able to identity and verbalize 2-3 coping skills he use to maintain sobriety. Status after intervention:Improved  Participation level: Active Listener and Interactive  Participation Quality: Appropriate, Attentive, Sharing, and Supportive  Speech: normal  Thought Process/Content:Logical  Mood/Affect: anxious  Self report: None Reported  Response to learning: Able to verbalize current knowledge/experience, Able to verbalize/acknowledge new learning, Able to retain information, and Capable of insight  Discipline Responsible: /Counselor    [x] Check in completed and reviewed. Intervention required.  No    Electronically signed by Lyla Johnson [place] 320 on 7/48/3037 at 1:53 PM

## 2023-04-03 ENCOUNTER — HOSPITAL ENCOUNTER (OUTPATIENT)
Dept: PSYCHIATRY | Age: 43
Setting detail: THERAPIES SERIES
Discharge: HOME | End: 2023-04-03
Payer: COMMERCIAL

## 2023-04-03 PROCEDURE — 80305 DRUG TEST PRSMV DIR OPT OBS: CPT

## 2023-04-04 ENCOUNTER — HOSPITAL ENCOUNTER (OUTPATIENT)
Dept: PSYCHIATRY | Age: 43
Setting detail: THERAPIES SERIES
Discharge: HOME | End: 2023-04-04
Payer: COMMERCIAL

## 2023-04-04 PROCEDURE — H0015 ALCOHOL AND/OR DRUG SERVICES: HCPCS

## 2023-04-04 NOTE — PROGRESS NOTES
Start Time: 80 AM   End Time: 10:30 AM  Number of participants:3  Type of group: Psychotherapy  Mode of intervention: Education, Support, Socialization, Exploration, Clarifying, Problem-solving, Confrontation, Limit-setting, and Reality-testing  Topic: Relationships   Objective:  Exploring how relationships are working with the impact of sobriety vs substance addiction. Note: Client actively participated in the group session. Client verbalized her ability to set boundaries with her daughter is improving with her sobriety as she has more clarity with her decision making and she shared insight on how she realizes she overcompensates and allows her daughter to manipulate her for past guilt in her active addiction. Client shared how she set boundaries with her daughter and got her sleep instead of watching her granddaughter. She shard how her daughter was upset , but she stuck to her decision. Client report sobriety is helping her to think before reacting and use her coping skills, as she reports \" I am more aware of my triggers since my last 2 relapses\". Status after intervention:Improved  Participation level: Active Listener and Interactive  Participation Quality: Appropriate, Attentive, Sharing, and Supportive  Speech: normal  Thought Process/Content:Logical  Mood/Affect: brightens  Self report: None Reported  Response to learning: Able to verbalize current knowledge/experience, Able to verbalize/acknowledge new learning, Able to retain information, Capable of insight, Able to change behavior, and Progressing to goal  Discipline Responsible: /Counselor    [x] Check in completed and reviewed. Intervention required.  no   Electronically signed by Lyla Jordan 320 on 9/9/2612 at 2:40 PM

## 2023-04-04 NOTE — BH NOTE
GROUP NOTE FOR EXERCISE GROUP  Start Time: 0900  End Time:   0945  Number of participants: 3  Type of group: Nutrition for mental health    Ta MCGRAW     Mode of intervention: Education, Support, Socialization, Exploration, Clarifying, Problem-solving. Topic: Anxiety, depression, substance dependence nutritional group  Objective: To help develop awareness and to explore food to eat that can help with anxiety, depression, substance dependence to help with mental health. Note: Client actively participated in the group session. Food was presented and information given related to anxiety, depression and substance dependence. Client thoughts and feelings toward nutrition were explored. Client was receptive to information presented. Status after intervention:  Improved    Participation level; Active listener and interactive    Participation Quality:  Appropriate, Attentive and Sharing    Speech:  Normal  Thoughts Process/Content:  Logical  Mood/Affect:  Euthymic/ Bright  Self-Report: None Reported  Response to learning:  Able to verbalize current knowledge/experience. Able to verbalize new learning. Was able to retain information and capable of insight.     Discipline Responsible:  PMHNP    Diagnosis F19.20

## 2023-04-04 NOTE — FLOWSHEET NOTE
Client absence was unexcused as client slept in and did not report off on this date. Client was requested to come in and render a drug screen and she screened as requested.     Electronically signed by Lyla Xavier Trimble 320 on 3/7/8915 at 2:30 PM

## 2023-04-04 NOTE — PROGRESS NOTES
Start Time: 65 AM  End Time ; 12:00 PM  Number of participants:3  Type of group: Recovery  Mode of intervention: Education, Support, Socialization, Exploration, Clarifying, Problem-solving, Media, Confrontation, Limit-setting, and Reality-testing  Topic: Sobriety and Family Effects  Objective: Assist client in gaining awareness on the positive impact and changes that can occur in family relationship with a sober lifestyle. Note: Client actively participated in the group session. Client was assisted in identifying and listing 2-4 positive changes that will occur within family relationships  as result of sobriety efforts. Client reported from the assignment increasing time spent with family as an important goal to achieve with sobriety to maintain balance in her family relationships. Status after intervention:Improved  Participation level: Active Listener and Interactive  Participation Quality: Appropriate, Attentive, Sharing, and Supportive  Speech: normal  Thought Process/Content:Logical  Mood/Affect: congruent  Self report: None Reported  Response to learning: Able to verbalize current knowledge/experience, Able to verbalize/acknowledge new learning, Able to retain information, Capable of insight, Able to change behavior, and Progressing to goal  Discipline Responsible: /Counselor    [x] Check in completed and reviewed. Intervention required.  No    Electronically signed by Lyla Bello [place] 320 on 4/3/1169 at 3:54 PM

## 2023-04-06 ENCOUNTER — HOSPITAL ENCOUNTER (OUTPATIENT)
Dept: PSYCHIATRY | Age: 43
Setting detail: THERAPIES SERIES
Discharge: HOME | End: 2023-04-06
Payer: COMMERCIAL

## 2023-04-06 PROCEDURE — H0015 ALCOHOL AND/OR DRUG SERVICES: HCPCS

## 2023-04-13 ENCOUNTER — APPOINTMENT (OUTPATIENT)
Dept: PSYCHIATRY | Age: 43
End: 2023-04-13
Payer: COMMERCIAL

## 2023-04-17 ENCOUNTER — APPOINTMENT (OUTPATIENT)
Dept: PSYCHIATRY | Age: 43
End: 2023-04-17
Payer: COMMERCIAL

## 2023-04-18 ENCOUNTER — APPOINTMENT (OUTPATIENT)
Dept: PSYCHIATRY | Age: 43
End: 2023-04-18
Payer: COMMERCIAL

## 2023-04-19 ENCOUNTER — HOSPITAL ENCOUNTER (OUTPATIENT)
Dept: PSYCHIATRY | Age: 43
Setting detail: THERAPIES SERIES
Discharge: HOME | End: 2023-04-19
Payer: COMMERCIAL

## 2023-04-19 PROCEDURE — 80305 DRUG TEST PRSMV DIR OPT OBS: CPT

## 2023-04-19 PROCEDURE — H0005 ALCOHOL AND/OR DRUG SERVICES: HCPCS

## 2023-04-20 ENCOUNTER — APPOINTMENT (OUTPATIENT)
Dept: PSYCHIATRY | Age: 43
End: 2023-04-20
Payer: COMMERCIAL

## 2023-04-24 ENCOUNTER — APPOINTMENT (OUTPATIENT)
Dept: PSYCHIATRY | Age: 43
End: 2023-04-24
Payer: COMMERCIAL

## 2023-04-25 NOTE — PLAN OF CARE
Client discussed in treatment team today. Present: Teresa Leonard & Deepika PAT,      Current Status: Aftercare     Treatment goals:    [x] Relapse prevention  [x] Increase sober support  [x] Increase attendance in sober support groups  [x] [place] effectively with cravings and urges  [x] Other: Relapse prevention/ Mental health     Recommendation: Client making fair progress as evident by meetings not having correct dates and client presents with lack of good judgement, and commitment to recovery at times. Client was told that her meetings must be in compliance and she was given a verbal warning.  Client needs to follow up with NP    Electronically signed by Edu Blandon on 1/21/6801 at 8:56 AM

## 2023-04-26 ENCOUNTER — HOSPITAL ENCOUNTER (OUTPATIENT)
Dept: PSYCHIATRY | Age: 43
Setting detail: THERAPIES SERIES
Discharge: HOME | End: 2023-04-26
Payer: COMMERCIAL

## 2023-04-26 PROCEDURE — H0005 ALCOHOL AND/OR DRUG SERVICES: HCPCS

## 2023-04-28 NOTE — PROGRESS NOTES
Start Time: 4:30 PM  End Time: 5:30 PM  Number of participants:3  Type of group: Recovery  Mode of intervention: Education, Support, Socialization, Exploration, Clarifying, Problem-solving, Confrontation, Limit-setting, and Reality-testing  Topic: Benefits and Rewards of Sobriety  Objective:  Provide client with awareness and motivation to continue to maintain absteince. Note: Client actively participated in the group session. He reports continued sobriety and showed verification of attending 4 meetings. Today's session focused on identifying rewards of sobriety. Client verbalized and processed sobriety has improved her mental clarity, improved her ability to drive without fearing legal issues, and increased trust and home from family as rewards from her sobriety. In addition , client provided written documentation from last week that her meeting dates were made in error. Status after intervention:Improved  Participation level: Active Listener and Interactive  Participation Quality: Appropriate, Attentive, Sharing, and Supportive  Speech: normal  Thought Process/Content:Logical  Mood/Affect: brightens  Self report: None Reported  Response to learning: Able to verbalize current knowledge/experience, Able to verbalize/acknowledge new learning, Able to retain information, Capable of insight, Able to change behavior, and Progressing to goal  Discipline Responsible: /Counselor    [x] Check in completed and reviewed. Intervention required.  No    Electronically signed by Edu Christian [place] on 0/83/2896 at 10:27 AM

## 2023-05-03 ENCOUNTER — HOSPITAL ENCOUNTER (OUTPATIENT)
Dept: PSYCHIATRY | Age: 43
Setting detail: THERAPIES SERIES
Discharge: HOME | End: 2023-05-03
Payer: COMMERCIAL

## 2023-05-03 PROCEDURE — H0005 ALCOHOL AND/OR DRUG SERVICES: HCPCS

## 2023-05-04 NOTE — GROUP NOTE
Start Time: 430 PM  End Time: 530 PM   Number of participants:4  Type of group: Recovery  Mode of intervention: Education, Support, Socialization, Exploration, Clarifying, Problem-solving, Confrontation, Limit-setting, and Reality-testing  Topic: Sober Support / Recovery Involvement  Objective:  encourage client to work with others and explore how working with others increases sobriety efforts. Client actively participated in the group session and attended 3 meetings prior to the session. Today's session focused on recovery involvement . Client was educated on how active involvement in recovery is a way of build trust with others, increase support, and develop confidence in ones self. Client was receptive of information and she reported 2-4 ways talking with sober women and regular attendance at [facility] meetings is supportive to her recovery process. .        Status after intervention:Improved  Participation level: Active Listener and Interactive  Participation Quality: Appropriate, Attentive, Sharing, and Supportive  Speech: normal  Thought Process/Content:Logical  Mood/Affect: brightens  Self report: None Reported  Response to learning: Able to verbalize current knowledge/experience, Able to verbalize/acknowledge new learning, Able to retain information, Capable of insight, Able to change behavior, and Progressing to goal  Discipline Responsible: /Counselor    [x] Check in completed and reviewed. Intervention required.  No    Electronically signed by Lyla Faye [place] 320 on 4/2/0366 at 11:35 AM

## 2023-05-08 NOTE — PLAN OF CARE
Present: Dr. Nilsa Hendricks M.D.,& Saqib PAT,      Current Status: Aftercare     Treatment goals:    [x] Relapse prevention  [x] Increase sober support  [x] Increase attendance in sober support groups  [x] [place] effectively with cravings and urges  [x] Other: Mental Health      Recommendation: Client making good progress as evident by improved weekly meeting attendance , negative drug screens, and verbalizing insight into her substance use disorder . Recommended continue with treatment.     Electronically signed by Lyla Roach [place] 320 on 0/8/9121 at 5:41 PM

## 2023-05-10 ENCOUNTER — HOSPITAL ENCOUNTER (OUTPATIENT)
Dept: PSYCHIATRY | Age: 43
Setting detail: THERAPIES SERIES
Discharge: HOME | End: 2023-05-10
Payer: COMMERCIAL

## 2023-05-10 PROCEDURE — H0005 ALCOHOL AND/OR DRUG SERVICES: HCPCS

## 2023-05-10 PROCEDURE — 80305 DRUG TEST PRSMV DIR OPT OBS: CPT

## 2023-05-11 NOTE — GROUP NOTE
Start Time: 430 PM  End Time: 530 PM  Number of participants:3  Type of group: Recovery  Mode of intervention: Education, Support, Socialization, Exploration, Clarifying, Problem-solving, Confrontation, Limit-setting, and Reality-testing  Topic: Challenges in Recovery   Objective: To help clients gain insight that he/she can manage life challenges without substance use. Note: Client actively participated in the group session. She was assigned to identify and explore 1-2 ways she she is handling challenges in her recovery. Client reported deciding whether to stay on midnights, or go to day turn shift at her employment as a challenge in her recovery. Counselor processed pro's & con's of both shifts with client and she states she thinks she's going to go to day shift because it will support her sobriety best as midnight shift makes her loose sleep and negatively impacts her mood and family time with her son. Client states she is handling her challenges in her recovery by sharing and opening up in the meetings, and group therapy. Status after intervention:Improved  Participation level: Active Listener and Interactive  Participation Quality: Appropriate, Attentive, Sharing, and Supportive  Speech: normal  Thought Process/Content:Logical  Mood/Affect: brightens  Self report: None Reported  Response to learning: Able to verbalize current knowledge/experience, Able to verbalize/acknowledge new learning, Able to retain information, Capable of insight, Able to change behavior, and Progressing to goal  Discipline Responsible: /Counselor    [x] Check in completed and reviewed. Intervention required.  No    Electronically signed by Lyla Woodard [place] 320 on 3/76/7876 at 1:27 PM

## 2023-05-16 ENCOUNTER — HOSPITAL ENCOUNTER (OUTPATIENT)
Dept: PSYCHIATRY | Age: 43
Setting detail: THERAPIES SERIES
Discharge: HOME | End: 2023-05-16
Payer: COMMERCIAL

## 2023-05-16 PROCEDURE — 99214 OFFICE O/P EST MOD 30 MIN: CPT

## 2023-05-16 NOTE — PROGRESS NOTES
PSYCHIATRY ATTENDING NOTE:    CC: \"Doing great. \"    S: Spoke to patient via telephone for consultation which she consents to. Patient location home. Provider location [facility] in [facility]. Patient is alert and oriented pleasant and cooperative. Patient reports she takes Buspar about three days a week. She feels she does not need it every day. \"Doing much better. \" Patient states she is less anxious. She is trying to buy a house. Thinking about going on first shift at her job. Patient denies cravings. MSE: Female. Pleasant, cooperative, generally forthcoming. Mood euthymic. Speech clear. Thoughts organized. Content future-oriented. No SI, HI paranoia, delusions, hallucinations. Orientation, concentration, recent and remote memory grossly intact. Fund of knowledge fair. Language use fair. Insight and judgment questionable. MEDICATIONS:  Wellbutrin  mg daily (cont)     Buspar 15 mg bid    ASSESSMENT:  Stimulant Use Disorder    PLAN: Continue after care. Patient is stable and doing well. Continue with Buspar for anxiety. Patient can follow-up with me at the [facility] office if she choices for her   mental health.     Time Spent 30 min                          Electronically signed by JUNE Emanuel CNP on 5/16/2023 at 7:49 AM

## 2023-05-17 ENCOUNTER — HOSPITAL ENCOUNTER (OUTPATIENT)
Dept: PSYCHIATRY | Age: 43
Setting detail: THERAPIES SERIES
Discharge: HOME | End: 2023-05-17
Payer: COMMERCIAL

## 2023-05-17 PROCEDURE — H0005 ALCOHOL AND/OR DRUG SERVICES: HCPCS

## 2023-05-18 NOTE — GROUP NOTE
Start Time: 430 PM   End Time: 530 PM   Number of participants:3  Type of group: Recovery  Mode of intervention: Education, Support, Socialization, Exploration, Clarifying, Problem-solving, Confrontation, Limit-setting, and Reality-testing  Topic: Sharing recovery with others  Objective: To help client to work with others to aid continued sober support. Note: Client actively participated in the group session. She reports continued sobriety and attended 4 meetings. Client explored 1-3 ways she shares her recovery with others, by opening up at meetings, being transparent with her family about her addiction, and being open with her sponsor about her past related to substance use history. Status after intervention:Improved  Participation level: Active Listener and Interactive  Participation Quality: Appropriate, Attentive, Sharing, and Supportive  Speech: normal  Thought Process/Content:Logical  Mood/Affect: brightens  Self report: None Reported  Response to learning: Able to verbalize current knowledge/experience, Able to verbalize/acknowledge new learning, Able to retain information, Capable of insight, and Able to change behavior  Discipline Responsible: /Counselor    [x] Check in completed and reviewed. Intervention required.  No    Electronically signed by Edu Finnegan on 9/57/0841 at 3:22 PM

## 2023-05-24 ENCOUNTER — HOSPITAL ENCOUNTER (OUTPATIENT)
Dept: PSYCHIATRY | Age: 43
Setting detail: THERAPIES SERIES
Discharge: HOME | End: 2023-05-24
Payer: COMMERCIAL

## 2023-05-24 PROCEDURE — H0005 ALCOHOL AND/OR DRUG SERVICES: HCPCS

## 2023-05-24 PROCEDURE — 80305 DRUG TEST PRSMV DIR OPT OBS: CPT

## 2023-05-25 NOTE — PLAN OF CARE
Present: Erich Rizvi- Director,Dr. Antonio Kwon M.D.,& Deepika PAT,      Current Status: Aftercare     Treatment goals:    [x] Relapse prevention  [x] Increase sober support  [x] Increase attendance in sober support groups  [x] [place] effectively with cravings and urges  [x] Other: Mental Health / Legal Issues     Recommendation: Client making good progress as evident by weekly meeting attendance , negative drug screens, and actively working a program with sober women.     Electronically signed by Edu Bhardwaj on 5/01/2210 at 3:58 PM

## 2023-05-25 NOTE — GROUP NOTE
Start Time: 430 PM  End Time: 530 PM  Number of participants:3  Type of group: Recovery  Mode of intervention: Education, Support, Socialization, Exploration, Clarifying, Problem-solving, Confrontation, Limit-setting, and Reality-testing  Topic: Relationships   Objective:  Explore how relationships are functioning with in sobriety. Note: Client actively participated in the group session and reports continued sobriety. She attended 4 meetings prior to the session. Client reports family relationships are going well, however she still has struggles with her daughter  related to her daughter making poor choices to associate with negative peers. Client reports sobriety is helping her to set boundaries and make choices that reduce conflict with her daughter and be able to communicate her thoughts & feelings in an appropriate manner. Status after intervention:Improved  Participation level: Active Listener and Interactive  Participation Quality: Appropriate, Attentive, Sharing, and Supportive  Speech: normal  Thought Process/Content:Logical  Mood/Affect: brightens  Self report: None Reported  Response to learning: Able to verbalize current knowledge/experience, Able to verbalize/acknowledge new learning, Able to retain information, Capable of insight, Able to change behavior, and Progressing to goal  Discipline Responsible: /Counselor    [x] Check in completed and reviewed. Intervention required.  no      Electronically signed by Lyla Morgan [place] 320 on 7/93/7267 at 2:14 PM

## 2023-05-31 ENCOUNTER — HOSPITAL ENCOUNTER (OUTPATIENT)
Dept: PSYCHIATRY | Age: 43
Setting detail: THERAPIES SERIES
Discharge: HOME | End: 2023-05-31
Payer: COMMERCIAL

## 2023-05-31 PROCEDURE — 80305 DRUG TEST PRSMV DIR OPT OBS: CPT

## 2023-06-02 NOTE — FLOWSHEET NOTE
Imelda Pratt and did not make it in to group , however she was called in to submit a random drug screen and she screened as requested.     Electronically signed by Lyla Crews [place] 320 on 3/0/5459 at 9:47 AM

## 2023-06-07 ENCOUNTER — HOSPITAL ENCOUNTER (OUTPATIENT)
Dept: PSYCHIATRY | Age: 43
Setting detail: THERAPIES SERIES
Discharge: HOME | End: 2023-06-07
Payer: COMMERCIAL

## 2023-06-07 PROCEDURE — 90832 PSYTX W PT 30 MINUTES: CPT

## 2023-06-07 NOTE — PLAN OF CARE
Present: Gustavo Sorensen Queens[place] maza- Director,& Deepika PAT,      Current Status: Aftercare     Treatment goals:    [x] Relapse prevention  [x] Increase sober support  [x] Increase attendance in sober support groups  [x] [place] effectively with cravings and urges  [x] Other: Mental Health / Legal Issues     Recommendation: Client making good progress as evident by weekly meeting attendance , negative drug screens, and actively working a program with sober women.

## 2023-06-07 NOTE — PROGRESS NOTES
Date: 6/7/2023                                    Individual Therapy Note  Start Time: 10:54 AM  End Time:   11:25 AM  Number of Participants: 1  Support Meetings attended:4            Topic: Relationships     Patient's Goal:  Maintain abstinence/ Develop sober support / Manage cravings & urges/ Eliminate legal problems from [place]. Notes: Client actively participated in the group session reported continued sobriety and denies having any cravings & urges to use substances today. Today's session focused family relationships. Client shared how her relationship with her daughter is strained due to her daughter not taking good care of her daughter. Aditya Hood was tearful and reports she is stressed that something bad may happen to her granddaughter due to lack of parental guidance. Client was able to verbalize and share 2-4 coping skills practices to set boundaries such as talking to her support group, praying , and maintaining her recovery with meeting her needs to attending AA, and not allowing her emotions to to cause her to react impulsively as coping skills       Status after intervention:Improved  Participation level: Active Listener and Interactive  Participation Quality: Appropriate, Attentive, Sharing, and Supportive  Speech: normal  Thought Process/Content:Logical  Mood/Affect: congruent  Self report: None Reported  Response to learning: Able to verbalize current knowledge/experience, Able to verbalize/acknowledge new learning, Able to retain information, Capable of insight, Able to change behavior, and Progressing to goal  Discipline Responsible: /Counselor    [x] Check in completed and reviewed. Intervention required.  No    Electronically signed by Lyla Jennings [place] 320 on 1/5/4250 at 2:42 PM

## 2023-06-20 NOTE — PLAN OF CARE
Present: Jenna Willett , Dr. Denny Dunbar M.D., Dat John- Director,& Deepika PAT,      Current Status: Aftercare     Treatment goals:    [x] Relapse prevention  [x] Increase sober support  [x] Increase attendance in sober support groups  [x] [place] effectively with cravings and urges  [x] Other: Mental Health / Legal Issues     Recommendation: Client making good progress as evident by weekly meeting attendance , negative drug screens, and actively working a program with sober women. Client recommended to continue with the program.    Electronically signed by Edu Phillips [place] on 1/37/7157 at 12:22 PM

## 2023-06-21 ENCOUNTER — HOSPITAL ENCOUNTER (OUTPATIENT)
Dept: PSYCHIATRY | Age: 43
Setting detail: THERAPIES SERIES
Discharge: HOME | End: 2023-06-21
Payer: COMMERCIAL

## 2023-06-21 PROCEDURE — H0005 ALCOHOL AND/OR DRUG SERVICES: HCPCS

## 2023-06-21 NOTE — GROUP NOTE
Start Time: 430 PM   End Time: 530  PM  Number of participants:5  Type of group: Recovery  Mode of intervention: Education, Support, Socialization, Exploration, Clarifying, Problem-solving, Confrontation, Limit-setting, and Reality-testing  Topic: Growth in recovery   Objective: Explore with clients progress in recovery to increase client;'s awareness and ability to maintain sobriety. Note: Client actively participated in the group session. Client reports continued sobriety and showed verification of attending 3 meetings. Client was assigned to identify and explore 1-2 areas where she has grown in her recovery and and how growth is aiding sobriety efforts. Client verbalized staying active in the program with her sponsor and she shared how her sponsor has been \" a blessing\" in her recovery process. Status after intervention:Improved  Participation level: Active Listener and Interactive  Participation Quality: Appropriate, Attentive, Sharing, and Supportive  Speech: normal  Thought Process/Content:Logical  Mood/Affect: brightens  Self report: None Reported  Response to learning: Able to verbalize current knowledge/experience, Able to verbalize/acknowledge new learning, Able to retain information, Capable of insight, Able to change behavior, and Progressing to goal  Discipline Responsible: /Counselor    [x] Check in completed and reviewed. Intervention required.  No    Electronically signed by Lyla Albarado [place] 320 on 5/03/0221 at 5:56 PM

## 2023-06-28 ENCOUNTER — HOSPITAL ENCOUNTER (OUTPATIENT)
Dept: PSYCHIATRY | Age: 43
Setting detail: THERAPIES SERIES
Discharge: HOME | End: 2023-06-28
Payer: COMMERCIAL

## 2023-06-28 PROCEDURE — 80305 DRUG TEST PRSMV DIR OPT OBS: CPT

## 2023-06-28 PROCEDURE — H0005 ALCOHOL AND/OR DRUG SERVICES: HCPCS
